# Patient Record
Sex: MALE | Race: BLACK OR AFRICAN AMERICAN | NOT HISPANIC OR LATINO | Employment: UNEMPLOYED | URBAN - NONMETROPOLITAN AREA
[De-identification: names, ages, dates, MRNs, and addresses within clinical notes are randomized per-mention and may not be internally consistent; named-entity substitution may affect disease eponyms.]

---

## 2021-09-10 ENCOUNTER — APPOINTMENT (OUTPATIENT)
Dept: GENERAL RADIOLOGY | Facility: HOSPITAL | Age: 61
End: 2021-09-10

## 2021-09-10 ENCOUNTER — HOSPITAL ENCOUNTER (EMERGENCY)
Facility: HOSPITAL | Age: 61
Discharge: HOME | End: 2021-09-11
Attending: EMERGENCY MEDICINE | Admitting: EMERGENCY MEDICINE

## 2021-09-10 DIAGNOSIS — R53.1 GENERAL WEAKNESS: Primary | ICD-10-CM

## 2021-09-10 DIAGNOSIS — U07.1 COVID-19: ICD-10-CM

## 2021-09-10 LAB
ALBUMIN SERPL-MCNC: 4.1 G/DL (ref 3.5–5.2)
ALBUMIN/GLOB SERPL: 1.4 G/DL
ALP SERPL-CCNC: 100 U/L (ref 39–117)
ALT SERPL W P-5'-P-CCNC: 22 U/L (ref 1–41)
ANION GAP SERPL CALCULATED.3IONS-SCNC: 15.6 MMOL/L (ref 5–15)
AST SERPL-CCNC: 35 U/L (ref 1–40)
BASOPHILS # BLD MANUAL: 0.05 10*3/MM3 (ref 0–0.2)
BASOPHILS NFR BLD AUTO: 1 % (ref 0–1.5)
BILIRUB SERPL-MCNC: 1.2 MG/DL (ref 0–1.2)
BUN SERPL-MCNC: 16 MG/DL (ref 8–23)
BUN/CREAT SERPL: 11.9 (ref 7–25)
CALCIUM SPEC-SCNC: 8.7 MG/DL (ref 8.6–10.5)
CHLORIDE SERPL-SCNC: 101 MMOL/L (ref 98–107)
CO2 SERPL-SCNC: 21.4 MMOL/L (ref 22–29)
CREAT SERPL-MCNC: 1.34 MG/DL (ref 0.76–1.27)
DEPRECATED RDW RBC AUTO: 42.4 FL (ref 37–54)
ERYTHROCYTE [DISTWIDTH] IN BLOOD BY AUTOMATED COUNT: 12.2 % (ref 12.3–15.4)
GFR SERPL CREATININE-BSD FRML MDRD: 65 ML/MIN/1.73
GLOBULIN UR ELPH-MCNC: 3 GM/DL
GLUCOSE SERPL-MCNC: 101 MG/DL (ref 65–99)
HCT VFR BLD AUTO: 48 % (ref 37.5–51)
HGB BLD-MCNC: 16.2 G/DL (ref 13–17.7)
LYMPHOCYTES # BLD MANUAL: 2.76 10*3/MM3 (ref 0.7–3.1)
LYMPHOCYTES NFR BLD MANUAL: 4 % (ref 5–12)
LYMPHOCYTES NFR BLD MANUAL: 55 % (ref 19.6–45.3)
MAGNESIUM SERPL-MCNC: 2 MG/DL (ref 1.6–2.4)
MCH RBC QN AUTO: 31.3 PG (ref 26.6–33)
MCHC RBC AUTO-ENTMCNC: 33.8 G/DL (ref 31.5–35.7)
MCV RBC AUTO: 92.7 FL (ref 79–97)
MONOCYTES # BLD AUTO: 0.2 10*3/MM3 (ref 0.1–0.9)
NEUTROPHILS # BLD AUTO: 2 10*3/MM3 (ref 1.7–7)
NEUTROPHILS NFR BLD MANUAL: 38 % (ref 42.7–76)
NEUTS BAND NFR BLD MANUAL: 2 % (ref 0–5)
PLATELET # BLD AUTO: 151 10*3/MM3 (ref 140–450)
PMV BLD AUTO: 10.5 FL (ref 6–12)
POTASSIUM SERPL-SCNC: 4.1 MMOL/L (ref 3.5–5.2)
PROT SERPL-MCNC: 7.1 G/DL (ref 6–8.5)
RBC # BLD AUTO: 5.18 10*6/MM3 (ref 4.14–5.8)
RBC MORPH BLD: NORMAL
SARS-COV-2 RNA PNL SPEC NAA+PROBE: DETECTED
SCAN SLIDE: NORMAL
SMALL PLATELETS BLD QL SMEAR: ADEQUATE
SODIUM SERPL-SCNC: 138 MMOL/L (ref 136–145)
TROPONIN T SERPL-MCNC: <0.01 NG/ML (ref 0–0.03)
WBC # BLD AUTO: 5.01 10*3/MM3 (ref 3.4–10.8)
WBC MORPH BLD: NORMAL

## 2021-09-10 PROCEDURE — 71045 X-RAY EXAM CHEST 1 VIEW: CPT

## 2021-09-10 PROCEDURE — 80053 COMPREHEN METABOLIC PANEL: CPT | Performed by: EMERGENCY MEDICINE

## 2021-09-10 PROCEDURE — 99284 EMERGENCY DEPT VISIT MOD MDM: CPT

## 2021-09-10 PROCEDURE — 83735 ASSAY OF MAGNESIUM: CPT | Performed by: EMERGENCY MEDICINE

## 2021-09-10 PROCEDURE — 84484 ASSAY OF TROPONIN QUANT: CPT | Performed by: EMERGENCY MEDICINE

## 2021-09-10 PROCEDURE — 85025 COMPLETE CBC W/AUTO DIFF WBC: CPT | Performed by: EMERGENCY MEDICINE

## 2021-09-10 PROCEDURE — 85007 BL SMEAR W/DIFF WBC COUNT: CPT | Performed by: EMERGENCY MEDICINE

## 2021-09-10 PROCEDURE — 93005 ELECTROCARDIOGRAM TRACING: CPT | Performed by: EMERGENCY MEDICINE

## 2021-09-10 PROCEDURE — 87635 SARS-COV-2 COVID-19 AMP PRB: CPT | Performed by: EMERGENCY MEDICINE

## 2021-09-10 RX ORDER — SODIUM CHLORIDE 0.9 % (FLUSH) 0.9 %
10 SYRINGE (ML) INJECTION AS NEEDED
Status: DISCONTINUED | OUTPATIENT
Start: 2021-09-10 | End: 2021-09-11 | Stop reason: HOSPADM

## 2021-09-11 VITALS
TEMPERATURE: 99.4 F | SYSTOLIC BLOOD PRESSURE: 146 MMHG | WEIGHT: 150 LBS | HEART RATE: 69 BPM | DIASTOLIC BLOOD PRESSURE: 104 MMHG | RESPIRATION RATE: 18 BRPM | OXYGEN SATURATION: 95 % | HEIGHT: 70 IN | BODY MASS INDEX: 21.47 KG/M2

## 2021-09-11 LAB
HOLD SPECIMEN: NORMAL
HOLD SPECIMEN: NORMAL
WHOLE BLOOD HOLD SPECIMEN: NORMAL
WHOLE BLOOD HOLD SPECIMEN: NORMAL

## 2021-09-11 RX ADMIN — SODIUM CHLORIDE 1000 ML: 9 INJECTION, SOLUTION INTRAVENOUS at 00:06

## 2021-09-11 NOTE — ED PROVIDER NOTES
Subjective   61-year-old male presenting with general weakness.  He states that for the last several days he has felt generally weak, some mild cough, shortness of breath.  No chest pain, abdominal pain, nausea, vomiting, chills.  He has had exposure to someone with Covid.  He is partially vaccinated.          Review of Systems   Constitutional: Positive for fatigue and fever.   HENT: Negative.    Eyes: Negative.    Respiratory: Positive for cough and shortness of breath.    Cardiovascular: Negative.    Gastrointestinal: Negative.    Genitourinary: Negative.    Musculoskeletal: Negative.    Skin: Negative.    Neurological: Negative.    Psychiatric/Behavioral: Negative.        Past Medical History:   Diagnosis Date   • Hypertension    • Stroke (CMS/HCC)        No Known Allergies    History reviewed. No pertinent surgical history.    History reviewed. No pertinent family history.    Social History     Socioeconomic History   • Marital status: Single     Spouse name: Not on file   • Number of children: Not on file   • Years of education: Not on file   • Highest education level: Not on file   Tobacco Use   • Smoking status: Current Every Day Smoker   Substance and Sexual Activity   • Alcohol use: Never   • Drug use: Never           Objective   Physical Exam  Vitals reviewed.   Constitutional:       General: He is not in acute distress.     Appearance: Normal appearance. He is not ill-appearing, toxic-appearing or diaphoretic.   HENT:      Head: Normocephalic and atraumatic.      Right Ear: External ear normal.      Left Ear: External ear normal.      Nose: Nose normal.      Mouth/Throat:      Mouth: Mucous membranes are moist.      Pharynx: Oropharynx is clear.   Eyes:      Extraocular Movements: Extraocular movements intact.      Conjunctiva/sclera: Conjunctivae normal.      Pupils: Pupils are equal, round, and reactive to light.   Cardiovascular:      Rate and Rhythm: Normal rate and regular rhythm.      Pulses:  Normal pulses.      Heart sounds: Normal heart sounds.   Pulmonary:      Effort: Pulmonary effort is normal. No respiratory distress.      Breath sounds: Normal breath sounds.   Abdominal:      General: Bowel sounds are normal. There is no distension.      Tenderness: There is no abdominal tenderness.   Musculoskeletal:         General: No swelling, tenderness or deformity. Normal range of motion.      Cervical back: Normal range of motion and neck supple.   Skin:     General: Skin is warm and dry.      Capillary Refill: Capillary refill takes less than 2 seconds.      Findings: No rash.   Neurological:      General: No focal deficit present.      Mental Status: He is alert and oriented to person, place, and time.   Psychiatric:         Mood and Affect: Mood normal.         Behavior: Behavior normal.         Procedures           ED Course                                           MDM  Number of Diagnoses or Management Options  COVID-19  General weakness  Diagnosis management comments: 61-year-old male with general weakness.  Well-developed, well-nourished man in no distress with exam as above.  His vital signs are normal.  His exam is nonfocal otherwise.  Will obtain labs, Covid swab, chest x-ray.  Will give symptomatic treatment.  Disposition pending.    DDx: Viral illness including COVID-19, pneumonia    EKG interpreted by me: Sinus rhythm, normal rate, PVC, some nonspecific T waves, no acute ST elevations, this is an abnormal EKG    Work-up here is notable for positive Covid test, otherwise no acute or significant abnormality.  He feels improved.  I do feel he is safe for discharge home.  We discussed supportive measures and outpatient follow-up.  He is happy with this plan.      Final diagnoses:   General weakness   COVID-19          Bharat Samaniego MD  09/11/21 0128

## 2021-09-17 ENCOUNTER — HOSPITAL ENCOUNTER (EMERGENCY)
Facility: HOSPITAL | Age: 61
Discharge: HOME | End: 2021-09-17
Attending: EMERGENCY MEDICINE | Admitting: EMERGENCY MEDICINE

## 2021-09-17 ENCOUNTER — APPOINTMENT (OUTPATIENT)
Dept: GENERAL RADIOLOGY | Facility: HOSPITAL | Age: 61
End: 2021-09-17

## 2021-09-17 ENCOUNTER — TELEPHONE (OUTPATIENT)
Dept: URGENT CARE | Facility: CLINIC | Age: 61
End: 2021-09-17

## 2021-09-17 VITALS
HEART RATE: 61 BPM | TEMPERATURE: 97.2 F | BODY MASS INDEX: 21 KG/M2 | DIASTOLIC BLOOD PRESSURE: 94 MMHG | RESPIRATION RATE: 18 BRPM | WEIGHT: 150 LBS | OXYGEN SATURATION: 97 % | SYSTOLIC BLOOD PRESSURE: 122 MMHG | HEIGHT: 71 IN

## 2021-09-17 DIAGNOSIS — U07.1 COVID-19: ICD-10-CM

## 2021-09-17 DIAGNOSIS — U07.1 PNEUMONIA DUE TO COVID-19 VIRUS: Primary | ICD-10-CM

## 2021-09-17 DIAGNOSIS — J12.82 PNEUMONIA DUE TO COVID-19 VIRUS: Primary | ICD-10-CM

## 2021-09-17 LAB
ALBUMIN SERPL-MCNC: 3.6 G/DL (ref 3.5–5.2)
ALBUMIN/GLOB SERPL: 1.1 G/DL
ALP SERPL-CCNC: 89 U/L (ref 39–117)
ALT SERPL W P-5'-P-CCNC: 26 U/L (ref 1–41)
ANION GAP SERPL CALCULATED.3IONS-SCNC: 9.6 MMOL/L (ref 5–15)
AST SERPL-CCNC: 19 U/L (ref 1–40)
BASOPHILS # BLD AUTO: 0.07 10*3/MM3 (ref 0–0.2)
BASOPHILS NFR BLD AUTO: 1.1 % (ref 0–1.5)
BILIRUB SERPL-MCNC: 0.9 MG/DL (ref 0–1.2)
BUN SERPL-MCNC: 10 MG/DL (ref 8–23)
BUN/CREAT SERPL: 8.4 (ref 7–25)
CALCIUM SPEC-SCNC: 8.8 MG/DL (ref 8.6–10.5)
CHLORIDE SERPL-SCNC: 104 MMOL/L (ref 98–107)
CO2 SERPL-SCNC: 26.4 MMOL/L (ref 22–29)
CREAT SERPL-MCNC: 1.19 MG/DL (ref 0.76–1.27)
DEPRECATED RDW RBC AUTO: 42 FL (ref 37–54)
EOSINOPHIL # BLD AUTO: 0.19 10*3/MM3 (ref 0–0.4)
EOSINOPHIL NFR BLD AUTO: 3 % (ref 0.3–6.2)
ERYTHROCYTE [DISTWIDTH] IN BLOOD BY AUTOMATED COUNT: 12 % (ref 12.3–15.4)
GFR SERPL CREATININE-BSD FRML MDRD: 75 ML/MIN/1.73
GLOBULIN UR ELPH-MCNC: 3.2 GM/DL
GLUCOSE SERPL-MCNC: 104 MG/DL (ref 65–99)
HCT VFR BLD AUTO: 41.6 % (ref 37.5–51)
HGB BLD-MCNC: 13.8 G/DL (ref 13–17.7)
HOLD SPECIMEN: NORMAL
HOLD SPECIMEN: NORMAL
IMM GRANULOCYTES # BLD AUTO: 0.29 10*3/MM3 (ref 0–0.05)
IMM GRANULOCYTES NFR BLD AUTO: 4.6 % (ref 0–0.5)
LYMPHOCYTES # BLD AUTO: 2.61 10*3/MM3 (ref 0.7–3.1)
LYMPHOCYTES NFR BLD AUTO: 41.8 % (ref 19.6–45.3)
MCH RBC QN AUTO: 31.4 PG (ref 26.6–33)
MCHC RBC AUTO-ENTMCNC: 33.2 G/DL (ref 31.5–35.7)
MCV RBC AUTO: 94.8 FL (ref 79–97)
MONOCYTES # BLD AUTO: 0.61 10*3/MM3 (ref 0.1–0.9)
MONOCYTES NFR BLD AUTO: 9.8 % (ref 5–12)
NEUTROPHILS NFR BLD AUTO: 2.47 10*3/MM3 (ref 1.7–7)
NEUTROPHILS NFR BLD AUTO: 39.7 % (ref 42.7–76)
NRBC BLD AUTO-RTO: 0 /100 WBC (ref 0–0.2)
PLATELET # BLD AUTO: 334 10*3/MM3 (ref 140–450)
PMV BLD AUTO: 10.7 FL (ref 6–12)
POTASSIUM SERPL-SCNC: 3.3 MMOL/L (ref 3.5–5.2)
PROT SERPL-MCNC: 6.8 G/DL (ref 6–8.5)
RBC # BLD AUTO: 4.39 10*6/MM3 (ref 4.14–5.8)
SODIUM SERPL-SCNC: 140 MMOL/L (ref 136–145)
WBC # BLD AUTO: 6.24 10*3/MM3 (ref 3.4–10.8)
WHOLE BLOOD HOLD SPECIMEN: NORMAL
WHOLE BLOOD HOLD SPECIMEN: NORMAL

## 2021-09-17 PROCEDURE — 71045 X-RAY EXAM CHEST 1 VIEW: CPT

## 2021-09-17 PROCEDURE — 80053 COMPREHEN METABOLIC PANEL: CPT | Performed by: EMERGENCY MEDICINE

## 2021-09-17 PROCEDURE — 85025 COMPLETE CBC W/AUTO DIFF WBC: CPT | Performed by: EMERGENCY MEDICINE

## 2021-09-17 PROCEDURE — 63710000001 DEXAMETHASONE PER 0.25 MG: Performed by: EMERGENCY MEDICINE

## 2021-09-17 PROCEDURE — 93005 ELECTROCARDIOGRAM TRACING: CPT | Performed by: EMERGENCY MEDICINE

## 2021-09-17 PROCEDURE — 99284 EMERGENCY DEPT VISIT MOD MDM: CPT

## 2021-09-17 RX ORDER — ONDANSETRON 4 MG/1
4 TABLET, FILM COATED ORAL EVERY 8 HOURS PRN
Qty: 9 TABLET | Refills: 0 | Status: SHIPPED | OUTPATIENT
Start: 2021-09-17

## 2021-09-17 RX ORDER — ALBUTEROL SULFATE 90 UG/1
2 AEROSOL, METERED RESPIRATORY (INHALATION) ONCE
Status: COMPLETED | OUTPATIENT
Start: 2021-09-17 | End: 2021-09-17

## 2021-09-17 RX ORDER — ALBUTEROL SULFATE 90 UG/1
2 AEROSOL, METERED RESPIRATORY (INHALATION) EVERY 6 HOURS PRN
Qty: 8.5 G | Refills: 0 | Status: SHIPPED | OUTPATIENT
Start: 2021-09-17

## 2021-09-17 RX ORDER — SODIUM CHLORIDE 0.9 % (FLUSH) 0.9 %
10 SYRINGE (ML) INJECTION AS NEEDED
Status: DISCONTINUED | OUTPATIENT
Start: 2021-09-17 | End: 2021-09-17 | Stop reason: HOSPADM

## 2021-09-17 RX ORDER — DEXAMETHASONE 6 MG/1
6 TABLET ORAL
Qty: 5 TABLET | Refills: 0 | Status: SHIPPED | OUTPATIENT
Start: 2021-09-17 | End: 2021-09-17 | Stop reason: SDUPTHER

## 2021-09-17 RX ORDER — AZITHROMYCIN 500 MG/1
500 TABLET, FILM COATED ORAL DAILY
Qty: 10 TABLET | Refills: 0 | Status: SHIPPED | OUTPATIENT
Start: 2021-09-17

## 2021-09-17 RX ORDER — DEXAMETHASONE 6 MG/1
6 TABLET ORAL
Qty: 5 TABLET | Refills: 0 | Status: SHIPPED | OUTPATIENT
Start: 2021-09-17

## 2021-09-17 RX ADMIN — ALBUTEROL SULFATE 2 PUFF: 90 AEROSOL, METERED RESPIRATORY (INHALATION) at 10:32

## 2021-09-17 RX ADMIN — DEXAMETHASONE 6 MG: 2 TABLET ORAL at 10:31

## 2021-09-17 NOTE — ED NOTES
Attempted IV access x 2, unsuccessful .  aware, blood obtained and sent to lab. Okay to hold on IV access @ this time per .     Mojgan Knight RN  09/17/21 0999

## 2021-09-17 NOTE — CASE MANAGEMENT/SOCIAL WORK
Continued Stay Note   [facility]     Patient Name: Oren Miranda  MRN: [id number]  Today's Date: 9/17/2021    Admit Date: 9/17/2021    Discharge Plan     Row Name 09/17/21 1315       Plan    Plan  Consult for transport home, Covid positive. Patient not eligible for medicaid transport. Spoke to patient, he will need to transport by EMS. States his friend gets off at 5:30 and could pick him up but he has  Covid also. Patient will utilize meds to beds to ensure medication access. Pharmacy aware. Notified staff.        Discharge Codes    No documentation.             Madina Mendoza LCSW

## 2021-09-17 NOTE — ED PROVIDER NOTES
TRIAGE CHIEF COMPLAINT:     Nursing and triage notes reviewed    Chief Complaint   Patient presents with   • Covid positive   • Fever   • Sore Throat   • Pneumonia      HPI: Oren Miranda is a 61 y.o. male who presents to the emergency department complaining of cough, fever, recent diagnosis of Covid.  Patient was diagnosed with Covid approximately 1 week previously.  Patient states he had gone to urgent care yesterday evening due to some shortness of breath.  He was diagnosed with COVID-19 pneumonia at that time.  He was prescribed steroids and antibiotics.  Patient has not had those prescriptions filled as of yet and was told that if he did not feel better by morning to go to the emergency department.  Patient states he still has some intermittent shortness of breath.  He denies to me any chest pain.  He states he just wants to get checked out to make sure he is doing okay.    REVIEW OF SYSTEMS: All other systems reviewed and are negative     PAST MEDICAL HISTORY:   Past Medical History:   Diagnosis Date   • Hypertension    • Stroke (CMS/Formerly [facility])         FAMILY HISTORY:   History reviewed. No pertinent family history.     SOCIAL HISTORY:   Social History     Socioeconomic History   • Marital status: Single     Spouse name: Not on file   • Number of children: Not on file   • Years of education: Not on file   • Highest education level: Not on file   Tobacco Use   • Smoking status: Current Every Day Smoker     Packs/day: 0.00     Types: Cigarettes   • Smokeless tobacco: Never Used   • Tobacco comment: 1 cig daily   Vaping Use   • Vaping Use: Never used   Substance and Sexual Activity   • Alcohol use: Never   • Drug use: Never   • Sexual activity: Defer        SURGICAL HISTORY:   History reviewed. No pertinent surgical history.     CURRENT MEDICATIONS:      Medication List      ASK your doctor about these medications    albuterol sulfate  (90 Base) MCG/ACT inhaler  Commonly known as: PROVENTIL HFA;VENTOLIN  HFA;PROAIR HFA  Inhale 2 puffs Every 6 (Six) Hours As Needed for Wheezing or Shortness of Air.     azithromycin 500 MG tablet  Commonly known as: ZITHROMAX  Take 1 tablet by mouth Daily.     methylPREDNISolone 4 MG dose pack  Commonly known as: MEDROL  Take as directed on package instructions.     ondansetron 4 MG tablet  Commonly known as: ZOFRAN  Take 1 tablet by mouth Every 8 (Eight) Hours As Needed for Nausea or Vomiting.             ALLERGIES: Patient has no known allergies.     PHYSICAL EXAM:   VITAL SIGNS:   Vitals:    09/17/21 0923   BP:    Pulse:    Resp:    Temp: 97.4 °F (36.3 °C)   SpO2:       CONSTITUTIONAL: Awake, oriented, appears non-toxic   HENT: Atraumatic, normocephalic, oral mucosa pink and moist, airway patent. Nares patent without drainage. External ears normal.   EYES: Conjunctiva clear  NECK: Trachea midline   CARDIOVASCULAR: Normal heart rate, Normal rhythm, No murmurs, rubs, gallops   PULMONARY/CHEST: Clear to auscultation, no rhonchi, wheezes, or rales. Symmetrical breath sounds.  ABDOMINAL: Non-distended, soft, non-tender - no rebound or guarding.  NEUROLOGIC: Non-focal, moving all four extremities, no gross sensory or motor deficits.   EXTREMITIES: No clubbing, cyanosis, or edema   SKIN: Warm, Dry, No erythema, No rash     ED COURSE / MEDICAL DECISION MAKING:   Oren Miranda is a 61 y.o. male who presents to the emergency department for evaluation of COVID-19.  Patient nondistressed on arrival in the emergency department.  He is breathing comfortably on room air on arrival.  Vital signs are stable.  Examination is otherwise largely unremarkable.  Will obtain an EKG, chest x-ray, basic labs for further evaluation.    EKG on arrival interpreted by me reveals sinus rhythm with rate of 58 bpm.  There are some nonspecific T wave changes.  This is an abnormal appearing EKG.    Chest x-ray per radiology interpretation reveals pneumonia similar to previous.    Laboratory test  unremarkable.    Patient remained breathing comfortably on room air.  We will discharge patient with quarantine, home pulse ox monitoring, incentive spirometry, and Decadron.  Return precautions were discussed.  Patient comfortable with this plan and discharged in good condition.    DECISION TO DISCHARGE/ADMIT: see ED care timeline     FINAL IMPRESSION:   1 --COVID-19 pneumonia  2 --   3 --     Electronically signed by: Payton Collins MD, 9/17/2021 09:37 EDT       Payton Collins MD  09/17/21 120

## 2021-09-17 NOTE — ED NOTES
Madina Mendoza with social work contacted at this time to help patient find a ride home.     [facility]  09/17/21 1677

## 2021-09-17 NOTE — ED NOTES
Contacted [facility] for transport to residence.     [facility]  09/17/21 7548

## 2021-09-17 NOTE — DISCHARGE INSTRUCTIONS
If your oxygen level begins to drop below 90% please return to the emergency department immediately.  If you become significantly more short of breath please return to the emergency department immediately.

## 2021-09-17 NOTE — ED NOTES
Incentive spirometer provided to pt and instructed on use, pt verbalized understanding of use.   Pt request a cab voucher to get home. Madina w/ case management called to provide assistance w/ transportation.     Mojgan Knight RN  09/17/21 9503

## 2021-09-17 NOTE — TELEPHONE ENCOUNTER
I called to give patient the official reading from the x-ray.  Patient reports he did not get his medicines filled last night.  He does not have a way to get there today.  He states he continues to feel bad and is having diarrhea.  He is not able to eat.  I have recommended he since he does not have a ride to the hospital either that he call 911 and go to the emergency department for further evaluation.

## 2021-09-20 ENCOUNTER — READMISSION MANAGEMENT (OUTPATIENT)
Dept: CALL CENTER | Facility: HOSPITAL | Age: 61
End: 2021-09-20

## 2021-09-20 NOTE — OUTREACH NOTE
Prep Survey      Responses   [facility] facility patient discharged from?  [facility]   Is LACE score < 7 ?  Yes   Emergency Room discharge w/ pulse ox?  Yes   Eligibility  Readm Mgmt   Discharge diagnosis  PN r/t Covid   Does the patient have one of the following disease processes/diagnoses(primary or secondary)?  COVID-19   Does the patient have Home health ordered?  No   Is there a DME ordered?  Yes   What DME was ordered?  Sent home w/ pulse ox   General alerts for this patient  ED discharge - call x 3 only   Prep survey completed?  Yes          Christi Palomino RN

## 2021-09-20 NOTE — OUTREACH NOTE
COVID-19 Week 1 Survey      Responses   [facility] patient discharged from?  [facility]   Does the patient have one of the following disease processes/diagnoses(primary or secondary)?  COVID-19   COVID-19 underlying condition?  None   Call Number  Call 1   Week 1 Call successful?  Yes   Call start time  1429   Call end time  1441   Is patient permission given to speak with other caregiver?  No   Meds reviewed with patient/caregiver?  Yes   Is the patient having any side effects they believe may be caused by any medication additions or changes?  No   Does the patient have all medications ordered at discharge?  Yes   Is the patient taking all medications as directed (includes completed medication regime)?  Yes   Does the patient have a primary care provider?   No   Comments regarding PCP  States will make a one week appt with Dr Pérez per AVS directions.   Does the patient have an appointment with their PCP or specialist within 7 days of discharge?  No   What is preventing the patient from scheduling follow up appointments within 7 days of discharge?  Haven't had time   Nursing Interventions  Advised patient to make appointment, Educated patient on importance of making appointment   Has the patient kept scheduled appointments due by today?  N/A   Has home health visited the patient within 72 hours of discharge?  N/A   Psychosocial issues?  No   Did the patient receive a copy of their discharge instructions?  Yes   Did the patient receive a copy of COVID-19 specific instructions?  Yes   Nursing interventions  Reviewed instructions with patient   What is the patient's perception of their health status since discharge?  Improving   Does the patient have any of the following symptoms?  None   Nursing Interventions  Nurse provided patient education   Pulse Ox monitoring  None   Is the patient/caregiver able to teach back steps to recovery at home?  Set small, achievable goals for return to baseline health, Eat a  well-balance diet, Rest and rebuild strength, gradually increase activity   If the patient is a current smoker, are they able to teach back resources for cessation?  Smoking cessation medications, Smoking cessation classes, Smoking cessation support groups, 8-897-FoxvJbb [ has decreased his smoking.]   Is the patient/caregiver able to teach back the hierarchy of who to call/visit for symptoms/problems? PCP, Specialist, Home health nurse, Urgent Care, ED, 911  Yes   COVID-19 call completed?  Yes   Wrap up additional comments   is doing well-denies any s/s of covid at this time.  is taking his meds as directed, and will make a f/u appt with Dr Pérez.  continues quarantine until 09/24/21. Patient was very appreciative of the care that he received in the ER. Denies any needs today.          Yaritza Bates RN

## 2021-09-21 ENCOUNTER — READMISSION MANAGEMENT (OUTPATIENT)
Dept: CALL CENTER | Facility: HOSPITAL | Age: 61
End: 2021-09-21

## 2021-09-21 NOTE — OUTREACH NOTE
COVID-19 Week 1 Survey      Responses   [facility] patient discharged from?  [facility]   Does the patient have one of the following disease processes/diagnoses(primary or secondary)?  COVID-19   COVID-19 underlying condition?  None   Call Number  Call 2   Week 1 Call successful?  Yes   Call start time  1346   Call end time  1357   General alerts for this patient  ED discharge - call x 3 only   Discharge diagnosis  PN r/t Covid   Is patient permission given to speak with other caregiver?  No   Meds reviewed with patient/caregiver?  Yes   Does the patient have all medications ordered at discharge?  Yes   Is the patient taking all medications as directed (includes completed medication regime)?  Yes   Comments regarding PCP  Advised for patient to call Dr Pérez's office to schedule a one week appt as per AVS instructions.    Does the patient have an appointment with their PCP or specialist within 7 days of discharge?  No   Nursing Interventions  Educated patient on importance of making appointment, Advised patient to make appointment   Has the patient kept scheduled appointments due by today?  N/A   Has home health visited the patient within 72 hours of discharge?  N/A   What DME was ordered?  Sent home w/ pulse ox   Has all DME been delivered?  Yes   Psychosocial issues?  No   Did the patient receive a copy of their discharge instructions?  Yes   Did the patient receive a copy of COVID-19 specific instructions?  Yes   Nursing interventions  Reviewed instructions with patient   What is the patient's perception of their health status since discharge?  Improving   Does the patient have any of the following symptoms?  Shortness of breath [Short of breath with activity. ]   Nursing Interventions  Nurse provided patient education   Pulse Ox monitoring  Intermittent   Pulse Ox device source  Hospital   O2 Sat: education provided  Sat levels, Monitoring frequency, When to seek care   Is the patient/caregiver able to teach  back steps to recovery at home?  Set small, achievable goals for return to baseline health, Rest and rebuild strength, gradually increase activity, Eat a well-balance diet   Is the patient/caregiver able to teach back the hierarchy of who to call/visit for symptoms/problems? PCP, Specialist, Home health nurse, Urgent Care, ED, 911  Yes   COVID-19 call completed?  Yes   Wrap up additional comments  Patient reports that he was told to quarantine for 2 weeks from positive test 9/10. Patient to quarantine thru 9/24/21.           Lisa Cole RN

## 2021-09-22 ENCOUNTER — READMISSION MANAGEMENT (OUTPATIENT)
Dept: CALL CENTER | Facility: HOSPITAL | Age: 61
End: 2021-09-22

## 2021-09-22 NOTE — OUTREACH NOTE
COVID-19 Week 1 Survey      Responses   [facility] patient discharged from?  [facility]   Does the patient have one of the following disease processes/diagnoses(primary or secondary)?  COVID-19   COVID-19 underlying condition?  None   Call Number  Call 3   Week 1 Call successful?  Yes   Call start time  1053   Call end time  1057   General alerts for this patient  ED discharge - call x 3 only   Discharge diagnosis  PN r/t Covid   Is patient permission given to speak with other caregiver?  Yes   List who call center can speak with  Yao Chaudhrys reviewed with patient/caregiver?  Yes   Is the patient taking all medications as directed (includes completed medication regime)?  Yes   Does the patient have a primary care provider?   No   Comments regarding PCP  he is working with someone to call him back from hospital to help him find PCP   Psychosocial issues?  No   Comments  Using IS at home. Appetite has returned.   What is the patient's perception of their health status since discharge?  Improving   Does the patient have any of the following symptoms?  Shortness of breath, Cough, Loss of taste/smell   Nursing Interventions  Nurse provided patient education   Pulse Ox monitoring  Intermittent   Pulse Ox device source  Hospital   O2 Sat comments  95% RA   O2 Sat: education provided  Monitoring frequency, Sat levels, When to seek care   Is the patient/caregiver able to teach back steps to recovery at home?  Set small, achievable goals for return to baseline health   If the patient is a current smoker, are they able to teach back resources for cessation?  -- [Used to smoke 1/2pk/day now smoking 2cig/day]   Is the patient/caregiver able to teach back the hierarchy of who to call/visit for symptoms/problems? PCP, Specialist, Home health nurse, Urgent Care, ED, 911  Yes   COVID-19 call completed?  Yes   Revoked  No further contact(revokes)-requires comment   Is the patient interested in additional calls from an  ambulatory ?  NOTE:  applies to high risk patients requiring additional follow-up.  No   Graduated/Revoked comments  3 Er calls   Wrap up additional comments  Patient reports that he was told to quarantine for 2 weeks from positive test 9/10. Patient to quarantine thru 9/24/21.           Serenity Agarwal RN